# Patient Record
Sex: FEMALE | Race: OTHER | Employment: OTHER | URBAN - METROPOLITAN AREA
[De-identification: names, ages, dates, MRNs, and addresses within clinical notes are randomized per-mention and may not be internally consistent; named-entity substitution may affect disease eponyms.]

---

## 2017-07-27 PROCEDURE — 88305 TISSUE EXAM BY PATHOLOGIST: CPT | Performed by: INTERNAL MEDICINE

## 2017-07-27 PROCEDURE — 88342 IMHCHEM/IMCYTCHM 1ST ANTB: CPT | Performed by: INTERNAL MEDICINE

## 2017-08-03 PROCEDURE — 82784 ASSAY IGA/IGD/IGG/IGM EACH: CPT | Performed by: INTERNAL MEDICINE

## 2017-08-03 PROCEDURE — 86255 FLUORESCENT ANTIBODY SCREEN: CPT | Performed by: INTERNAL MEDICINE

## 2017-08-03 PROCEDURE — 83516 IMMUNOASSAY NONANTIBODY: CPT | Performed by: INTERNAL MEDICINE

## 2017-11-02 PROCEDURE — 81003 URINALYSIS AUTO W/O SCOPE: CPT | Performed by: FAMILY MEDICINE

## 2018-07-03 PROCEDURE — 88307 TISSUE EXAM BY PATHOLOGIST: CPT | Performed by: SURGERY

## 2018-10-24 PROBLEM — Z00.00 WELL ADULT EXAM: Status: ACTIVE | Noted: 2018-10-24

## 2019-01-22 PROCEDURE — 81003 URINALYSIS AUTO W/O SCOPE: CPT | Performed by: FAMILY MEDICINE

## 2019-11-18 PROBLEM — Z00.00 WELL ADULT EXAM: Status: RESOLVED | Noted: 2018-10-24 | Resolved: 2019-11-18

## 2020-08-13 PROBLEM — E89.0 S/P PARTIAL THYROIDECTOMY: Status: ACTIVE | Noted: 2020-08-13

## 2021-02-18 PROBLEM — E03.2 IATROGENIC HYPOTHYROIDISM: Status: ACTIVE | Noted: 2021-02-18

## 2021-02-18 PROBLEM — Z86.39 HISTORY OF HYPERTHYROIDISM: Status: ACTIVE | Noted: 2021-02-18

## 2021-03-04 NOTE — LETTER
[address]  Authorization for Invasive Procedure                                                                                           1. I hereby authorize Marco A Lucio DO, my physician and his/her assistants (if applicable), which may include medical students, residents, and/or fellows, to perform the following surgical operation/ procedure and administer such anesthesia as may be determined necessary by my physician: Operation/Procedure name (s) BRONCHOSCOPY with bronchveolar lavage and possible brushings on Monica Bruryanichesi   2. I recognize that during the surgical operation/procedure, unforeseen conditions may necessitate additional or different procedures than those listed above. I, therefore, further authorize and request that the above-named surgeon, assistants, or designees perform such procedures as are, in their judgment, necessary and desirable. 3.   My surgeon/physician has discussed prior to my surgery the potential benefits, risks and side effects of this procedure; the likelihood of achieving goals; and potential problems that might occur during recuperation. They also discussed reasonable alternatives to the procedure, including risks, benefits, and side effects related to the alternatives and risks related to not receiving this procedure. I have had all my questions answered and I acknowledge that no guarantee has been made as to the result that may be obtained. 4.   Should the need arise during my operation/procedure, which includes change of level of care prior to discharge, I also consent to the administration of blood and/or blood products. Further, I understand that despite careful testing and screening of blood or blood products by collecting agencies, I may still be subject to ill effects as a result of receiving a blood transfusion and/or blood products.   The following are some, but not all, of the potential risks that can occur: fever and allergic reactions, hemolytic reactions, transmission of diseases such as Hepatitis, AIDS and Cytomegalovirus (CMV) and fluid overload. In the event that I wish to have an autologous transfusion of my own blood, or a directed donor transfusion, I will discuss this with my physician. Check only if Refusing Blood or Blood Products  I understand refusal of blood or blood products as deemed necessary by my physician may have serious consequences to my condition to include possible death. I hereby assume responsibility for my refusal and release the hospital, its personnel, and my physicians from any responsibility for the consequences of my refusal.    o  Refuse   5. I authorize the use of any specimen, organs, tissues, body parts or foreign objects that may be removed from my body during the operation/procedure for diagnosis, research or teaching purposes and their subsequent disposal by hospital authorities. I also authorize the release of specimen test results and/or written reports to my treating physician on the hospital medical staff or other referring or consulting physicians involved in my care, at the discretion of the Pathologist or my treating physician. 6.   I consent to the photographing or videotaping of the operations or procedures to be performed, including appropriate portions of my body for medical, scientific, or educational purposes, provided my identity is not revealed by the pictures or by descriptive texts accompanying them. If the procedure has been photographed/videotaped, the surgeon will obtain the original picture, image, videotape or CD. The hospital will not be responsible for storage, release or maintenance of the picture, image, tape or CD.    7.   I consent to the presence of a  or observers in the operating room as deemed necessary by my physician or their designees.     8.   I recognize that in the event my procedure results in extended X-Ray/fluoroscopy time, I may develop a skin reaction. 9. If I have a Do Not Attempt Resuscitation (DNAR) order in place, that status will be suspended while in the operating room, procedural suite, and during the recovery period unless otherwise explicitly stated by me (or a person authorized to consent on my behalf). The surgeon or my attending physician will determine when the applicable recovery period ends for purposes of reinstating the DNAR order. 10. Patients having a sterilization procedure: I understand that if the procedure is successful the results will be permanent and it will therefore be impossible for me to inseminate, conceive, or bear children. I also understand that the procedure is intended to result in sterility, although the result has not been guaranteed. 11. I acknowledge that my physician has explained sedation/analgesia administration to me including the risk and benefits I consent to the administration of sedation/analgesia as may be necessary or desirable in the judgment of my physician. I CERTIFY THAT I HAVE READ AND FULLY UNDERSTAND THE ABOVE CONSENT TO OPERATION and/or OTHER PROCEDURE.     _________________________________________ _________________________________     ___________________________________  Signature of Patient     Signature of Responsible Person                   Printed Name of Responsible Person                              _________________________________________ ______________________________        ___________________________________  Signature of Witness         Date  Time         Relationship to Patient    STATEMENT OF PHYSICIAN My signature below affirms that prior to the time of the procedure; I have explained to the patient and/or his/her legal representative, the risks and benefits involved in the proposed treatment and any reasonable alternative to the proposed treatment.  I have also explained the risks and benefits involved in refusal of the proposed treatment and alternatives to the proposed treatment and have answered the patient's questions.  If I have a significant financial interest in a co-management agreement or a significant financial interest in any product or implant, or other significant relationship used in this procedure/surgery, I have disclosed this and had a discussion with my patient.     _______________________________________________________________ _____________________________  Brendia Fat of Physician)                                                                                         (Date)                                   (Time)  Patient Name: Kraig Child    : 1945   Printed: 2023      Medical Record #: [id number]                                              Page 1 of 1 Patent

## 2022-03-31 PROBLEM — M75.101 ROTATOR CUFF SYNDROME OF RIGHT SHOULDER: Status: ACTIVE | Noted: 2022-03-31

## 2023-01-08 ENCOUNTER — LAB ENCOUNTER (OUTPATIENT)
Dept: LAB | Facility: HOSPITAL | Age: 78
End: 2023-01-08
Attending: STUDENT IN AN ORGANIZED HEALTH CARE EDUCATION/TRAINING PROGRAM
Payer: MEDICARE

## 2023-01-08 DIAGNOSIS — Z01.818 PRE-OP TESTING: ICD-10-CM

## 2023-01-09 LAB — SARS-COV-2 RNA RESP QL NAA+PROBE: NOT DETECTED

## 2023-01-11 ENCOUNTER — ANESTHESIA EVENT (OUTPATIENT)
Dept: ENDOSCOPY | Facility: HOSPITAL | Age: 78
End: 2023-01-11
Payer: MEDICARE

## 2023-01-11 ENCOUNTER — HOSPITAL ENCOUNTER (OUTPATIENT)
Facility: HOSPITAL | Age: 78
Setting detail: HOSPITAL OUTPATIENT SURGERY
Discharge: HOME | End: 2023-01-11
Attending: STUDENT IN AN ORGANIZED HEALTH CARE EDUCATION/TRAINING PROGRAM | Admitting: STUDENT IN AN ORGANIZED HEALTH CARE EDUCATION/TRAINING PROGRAM
Payer: MEDICARE

## 2023-01-11 ENCOUNTER — ANESTHESIA (OUTPATIENT)
Dept: ENDOSCOPY | Facility: HOSPITAL | Age: 78
End: 2023-01-11
Payer: MEDICARE

## 2023-01-11 DIAGNOSIS — Z01.818 PRE-OP TESTING: Primary | ICD-10-CM

## 2023-01-11 DIAGNOSIS — R93.89 ABNORMAL CT OF THE CHEST: ICD-10-CM

## 2023-01-11 LAB
BASOPHILS NFR BRONCH: 0 %
COLOR FLD: COLORLESS
EOSINOPHIL NFR BRONCH: 3 %
LYMPHOCYTES NFR BRONCH: 8 %
M TB CMPLX RRNA SPEC QL PROBE: NOT DETECTED
MONOS+MACROS NFR BRONCH: 9 %
NEUTROPHILS NFR BRONCH: 79 %
RBC # FLD: 494 /CUMM (ref ?–1)
TOTAL CELLS COUNTED BRONCH: 536 /CUMM (ref ?–1)
TOTAL CELLS COUNTED FLD: 100
WBC CALC (IRIS) BRW: 531 /CUMM
WBC OTHER NFR BRONCH: 1 %

## 2023-01-11 PROCEDURE — 87205 SMEAR GRAM STAIN: CPT | Performed by: STUDENT IN AN ORGANIZED HEALTH CARE EDUCATION/TRAINING PROGRAM

## 2023-01-11 PROCEDURE — 88112 CYTOPATH CELL ENHANCE TECH: CPT | Performed by: STUDENT IN AN ORGANIZED HEALTH CARE EDUCATION/TRAINING PROGRAM

## 2023-01-11 PROCEDURE — 87305 ASPERGILLUS AG IA: CPT | Performed by: STUDENT IN AN ORGANIZED HEALTH CARE EDUCATION/TRAINING PROGRAM

## 2023-01-11 PROCEDURE — 87556 M.TUBERCULO DNA AMP PROBE: CPT | Performed by: STUDENT IN AN ORGANIZED HEALTH CARE EDUCATION/TRAINING PROGRAM

## 2023-01-11 PROCEDURE — 88160 CYTOPATH SMEAR OTHER SOURCE: CPT | Performed by: STUDENT IN AN ORGANIZED HEALTH CARE EDUCATION/TRAINING PROGRAM

## 2023-01-11 PROCEDURE — 87071 CULTURE AEROBIC QUANT OTHER: CPT | Performed by: STUDENT IN AN ORGANIZED HEALTH CARE EDUCATION/TRAINING PROGRAM

## 2023-01-11 PROCEDURE — 89050 BODY FLUID CELL COUNT: CPT | Performed by: STUDENT IN AN ORGANIZED HEALTH CARE EDUCATION/TRAINING PROGRAM

## 2023-01-11 PROCEDURE — 87015 SPECIMEN INFECT AGNT CONCNTJ: CPT

## 2023-01-11 PROCEDURE — 87206 SMEAR FLUORESCENT/ACID STAI: CPT | Performed by: STUDENT IN AN ORGANIZED HEALTH CARE EDUCATION/TRAINING PROGRAM

## 2023-01-11 PROCEDURE — 0B9F8ZX DRAINAGE OF RIGHT LOWER LUNG LOBE, VIA NATURAL OR ARTIFICIAL OPENING ENDOSCOPIC, DIAGNOSTIC: ICD-10-PCS | Performed by: STUDENT IN AN ORGANIZED HEALTH CARE EDUCATION/TRAINING PROGRAM

## 2023-01-11 PROCEDURE — 89051 BODY FLUID CELL COUNT: CPT | Performed by: STUDENT IN AN ORGANIZED HEALTH CARE EDUCATION/TRAINING PROGRAM

## 2023-01-11 PROCEDURE — 87798 DETECT AGENT NOS DNA AMP: CPT | Performed by: STUDENT IN AN ORGANIZED HEALTH CARE EDUCATION/TRAINING PROGRAM

## 2023-01-11 PROCEDURE — 87102 FUNGUS ISOLATION CULTURE: CPT | Performed by: STUDENT IN AN ORGANIZED HEALTH CARE EDUCATION/TRAINING PROGRAM

## 2023-01-11 PROCEDURE — 87116 MYCOBACTERIA CULTURE: CPT | Performed by: STUDENT IN AN ORGANIZED HEALTH CARE EDUCATION/TRAINING PROGRAM

## 2023-01-11 RX ORDER — GLYCOPYRROLATE 0.2 MG/ML
INJECTION, SOLUTION INTRAMUSCULAR; INTRAVENOUS AS NEEDED
Status: DISCONTINUED | OUTPATIENT
Start: 2023-01-11 | End: 2023-01-11 | Stop reason: SURG

## 2023-01-11 RX ORDER — NALOXONE HYDROCHLORIDE 0.4 MG/ML
80 INJECTION, SOLUTION INTRAMUSCULAR; INTRAVENOUS; SUBCUTANEOUS AS NEEDED
OUTPATIENT
Start: 2023-01-11 | End: 2023-01-11

## 2023-01-11 RX ORDER — ONDANSETRON 2 MG/ML
INJECTION INTRAMUSCULAR; INTRAVENOUS AS NEEDED
Status: DISCONTINUED | OUTPATIENT
Start: 2023-01-11 | End: 2023-01-11 | Stop reason: SURG

## 2023-01-11 RX ORDER — SODIUM CHLORIDE, SODIUM LACTATE, POTASSIUM CHLORIDE, CALCIUM CHLORIDE 600; 310; 30; 20 MG/100ML; MG/100ML; MG/100ML; MG/100ML
INJECTION, SOLUTION INTRAVENOUS CONTINUOUS
OUTPATIENT
Start: 2023-01-11

## 2023-01-11 RX ORDER — SODIUM CHLORIDE, SODIUM LACTATE, POTASSIUM CHLORIDE, CALCIUM CHLORIDE 600; 310; 30; 20 MG/100ML; MG/100ML; MG/100ML; MG/100ML
INJECTION, SOLUTION INTRAVENOUS CONTINUOUS
Status: DISCONTINUED | OUTPATIENT
Start: 2023-01-11 | End: 2023-01-11

## 2023-01-11 RX ORDER — SODIUM CHLORIDE 9 MG/ML
INJECTION, SOLUTION INTRAVENOUS CONTINUOUS PRN
Status: DISCONTINUED | OUTPATIENT
Start: 2023-01-11 | End: 2023-01-11 | Stop reason: SURG

## 2023-01-11 RX ADMIN — SODIUM CHLORIDE: 9 INJECTION, SOLUTION INTRAVENOUS at 09:16:00

## 2023-01-11 RX ADMIN — ONDANSETRON 4 MG: 2 INJECTION INTRAMUSCULAR; INTRAVENOUS at 09:17:00

## 2023-01-11 RX ADMIN — GLYCOPYRROLATE 0.2 MG: 0.2 INJECTION, SOLUTION INTRAMUSCULAR; INTRAVENOUS at 09:17:00

## 2023-01-11 NOTE — PROCEDURES
Bronchoscopy Procedure Note     Preop diagnosis: Abnormal CT Chest    Postop diagnosis: Same    Procedure Performed:  1. Bronchoscopy with airway inspection  2. Bronchoscopy with bronchoalveolar lavage; right lower lobe    Pre-procedure: Informed consent was obtained and documented. A time out was performed. Vital signs, laboratory studies, chest imaging, medications, and allergies were reviewed. Continuous cardiopulmonary monitoring and supplemental oxygen were provided. The posterior oral pharynx was anesthetized with lidocaine gargle and nebulizer. Procedure: A bronchoscope was passed through the mouth. The oropharynx and larynx were visualized and transversed without difficulty and the bronchoscope was passed through the vocal cords. 2 cc aliquots of 1% lidocaine were applied as needed to the larynx and tracheobronchial tree. Patient was sedated using MAC anesthesia; for further details, please see anesthesia records    Airway: Inspection showed a normal trachea, sharp yumiko, and otherwise normal anatomy of the left and right tracheobronchial trees to the subsegmental level. BAL location: right lower lobe, RB5 segment  Amount instilled: 100 cc  Amount returned: 30 cc    The sample was sent for cell count with diff, gram stain and culture, AFB smear and culture, fungal smear and culture and cytology. Post-procedure: The patient tolerated the procedural well without immediate complications. Marvin Hendrickson.  [facility]    [unfilled]  9:34 AM

## 2023-01-11 NOTE — DISCHARGE INSTRUCTIONS
OUTPATIENT INSTRUCTIONS FOLLOWING BRONCHOSCOPY    1. Due to the after effect of the medication given during yoru bronchoscopy, we would advise that        for 12 hours after the procedure you:  Not return to work  Not drive a car or other vehicle  Not operate machinery (including kitchen equipment)  Not drink alcohol    2. Prior to your examination, a local anesthetic was used to numb the back of your throat. Do        not eat for one to two hours. Sip fluids initially and advance to your regular diet as tolerated. 3.  Tenderness or swelling occasionally occur at the site where the medication was injected. Should this occur, it is helpful to apply heat to the area and notify your doctor. 4.  Contact your doctor is you experience any of the following:  Difficulty in breathing  Fever greater than 101  Large amounts of blood in sputum (small amount of blood in sputum expected after the biopsy)    5. If a biopsy or cytology was performed, you will be notified should further investigation be recommended. Your referring physician will receive a full report of your examination and will contact you.     6. DOCTOR'S COMMENTS:

## 2023-01-12 VITALS
RESPIRATION RATE: 19 BRPM | OXYGEN SATURATION: 97 % | BODY MASS INDEX: 21.6 KG/M2 | HEART RATE: 90 BPM | SYSTOLIC BLOOD PRESSURE: 121 MMHG | HEIGHT: 60 IN | DIASTOLIC BLOOD PRESSURE: 81 MMHG | TEMPERATURE: 97 F | WEIGHT: 110 LBS

## 2023-01-13 LAB
ASPERGILLUS GALACTOMANNAN AG, BAL: NEGATIVE
ASPERGILLUS GALACTOMANNAN INDX: 0.09
CALCOFLUOR WHITE STAIN RESULT: NEGATIVE

## 2023-04-11 ENCOUNTER — OFFICE VISIT (OUTPATIENT)
Dept: OTOLARYNGOLOGY | Facility: CLINIC | Age: 78
End: 2023-04-11

## 2023-04-11 VITALS — BODY MASS INDEX: 21.6 KG/M2 | HEIGHT: 60 IN | WEIGHT: 110 LBS

## 2023-04-11 DIAGNOSIS — R09.82 PND (POST-NASAL DRIP): Primary | ICD-10-CM

## 2023-04-11 PROCEDURE — 99203 OFFICE O/P NEW LOW 30 MIN: CPT | Performed by: STUDENT IN AN ORGANIZED HEALTH CARE EDUCATION/TRAINING PROGRAM

## 2023-04-11 RX ORDER — GLYCOPYRROLATE 1 MG/1
1 TABLET ORAL 2 TIMES DAILY
Qty: 90 TABLET | Refills: 1 | Status: SHIPPED | OUTPATIENT
Start: 2023-04-11 | End: 2023-04-11

## 2023-04-11 RX ORDER — GLYCOPYRROLATE 1 MG/1
1 TABLET ORAL 2 TIMES DAILY
Qty: 90 TABLET | Refills: 1 | Status: SHIPPED | OUTPATIENT
Start: 2023-04-11 | End: 2023-05-11

## 2023-04-11 RX ORDER — MONTELUKAST SODIUM 10 MG/1
10 TABLET ORAL NIGHTLY
Qty: 30 TABLET | Refills: 3 | Status: SHIPPED | OUTPATIENT
Start: 2023-04-11 | End: 2023-04-11

## 2023-04-25 ENCOUNTER — PATIENT MESSAGE (OUTPATIENT)
Dept: OTOLARYNGOLOGY | Facility: CLINIC | Age: 78
End: 2023-04-25

## 2023-04-28 ENCOUNTER — OFFICE VISIT (OUTPATIENT)
Dept: OTOLARYNGOLOGY | Facility: CLINIC | Age: 78
End: 2023-04-28

## 2023-04-28 VITALS — WEIGHT: 110 LBS | BODY MASS INDEX: 21.6 KG/M2 | HEIGHT: 60 IN

## 2023-04-28 DIAGNOSIS — R09.82 PND (POST-NASAL DRIP): ICD-10-CM

## 2023-04-28 DIAGNOSIS — H69.90 EUSTACHIAN TUBE DISORDER, UNSPECIFIED LATERALITY: Primary | ICD-10-CM

## 2023-04-28 RX ORDER — METHYLPREDNISOLONE 4 MG/1
TABLET ORAL
Qty: 21 TABLET | Refills: 0 | Status: SHIPPED | OUTPATIENT
Start: 2023-04-28

## 2023-04-28 RX ORDER — PSEUDOEPHEDRINE HCL 120 MG/1
120 TABLET, FILM COATED, EXTENDED RELEASE ORAL EVERY 12 HOURS
Qty: 42 TABLET | Refills: 0 | Status: SHIPPED | OUTPATIENT
Start: 2023-04-28 | End: 2023-05-19

## 2023-05-12 ENCOUNTER — OFFICE VISIT (OUTPATIENT)
Dept: OTOLARYNGOLOGY | Facility: CLINIC | Age: 78
End: 2023-05-12

## 2023-05-12 DIAGNOSIS — H69.90 EUSTACHIAN TUBE DISORDER, UNSPECIFIED LATERALITY: Primary | ICD-10-CM

## 2023-05-12 DIAGNOSIS — R09.82 PND (POST-NASAL DRIP): ICD-10-CM

## 2023-05-31 NOTE — TELEPHONE ENCOUNTER
From: Halima Portillo  To: Phong Epps MD  Sent: 4/25/2023 3:34 PM CDT  Subject: Dick Senior  I made an appointment to see you on Friday the 28th  My Ear is plugged and I have tried everything. I'm   taking Allegra and the medication you gave me  plus all the nasal sprays that were given to me. Nothing is helping. Maybe you can try something else.   Thank you  Halima Portillo

## 2023-06-16 RX ORDER — PSEUDOEPHEDRINE HCL 120 MG/1
TABLET, FILM COATED, EXTENDED RELEASE ORAL
Qty: 42 TABLET | Refills: 0 | Status: SHIPPED | OUTPATIENT
Start: 2023-06-16

## 2023-08-02 NOTE — TELEPHONE ENCOUNTER
This refill request is being sent to the provider for the following reason:  []Patient has not had an appointment within the past 12 months but has made an appointment on: ___  [x]Medication is not within protocol  []Patient did not complete follow up recommendations  []Other: ___  Dr. Melissa Zambrano, please review and send. Thank you.

## 2023-08-14 RX ORDER — PSEUDOEPHEDRINE HCL 120 MG/1
120 TABLET, FILM COATED, EXTENDED RELEASE ORAL EVERY 12 HOURS
Qty: 42 TABLET | Refills: 0 | Status: SHIPPED | OUTPATIENT
Start: 2023-08-14

## 2023-09-18 ENCOUNTER — OFFICE VISIT (OUTPATIENT)
Dept: OTOLARYNGOLOGY | Facility: CLINIC | Age: 78
End: 2023-09-18

## 2023-09-18 VITALS — BODY MASS INDEX: 21 KG/M2 | HEIGHT: 60 IN

## 2023-09-18 DIAGNOSIS — H61.23 BILATERAL IMPACTED CERUMEN: Primary | ICD-10-CM

## 2023-09-18 DIAGNOSIS — R09.81 NASAL CONGESTION: ICD-10-CM

## 2023-09-18 DIAGNOSIS — R09.89 PHLEGM IN THROAT: ICD-10-CM

## 2023-09-18 DIAGNOSIS — R09.82 PND (POST-NASAL DRIP): ICD-10-CM

## 2023-09-18 DIAGNOSIS — J30.9 CHRONIC ALLERGIC RHINITIS: ICD-10-CM

## 2023-09-18 RX ORDER — IPRATROPIUM BROMIDE 21 UG/1
2 SPRAY, METERED NASAL 3 TIMES DAILY PRN
COMMUNITY
Start: 2023-08-08

## 2023-09-18 RX ORDER — LEVOTHYROXINE SODIUM 0.1 MG/1
100 TABLET ORAL DAILY
COMMUNITY
Start: 2023-07-13

## 2023-09-18 RX ORDER — MONTELUKAST SODIUM 10 MG/1
10 TABLET ORAL NIGHTLY
COMMUNITY
Start: 2023-05-30

## 2023-09-18 RX ORDER — FLUTICASONE PROPIONATE 50 MCG
2 SPRAY, SUSPENSION (ML) NASAL DAILY
COMMUNITY
Start: 2023-08-30

## 2023-09-18 RX ORDER — GLYCOPYRROLATE 1 MG/1
1 TABLET ORAL 3 TIMES DAILY
Qty: 90 TABLET | Refills: 0 | Status: SHIPPED | OUTPATIENT
Start: 2023-09-18 | End: 2023-10-18

## 2023-09-18 RX ORDER — GLYCOPYRROLATE 1 MG/1
1 TABLET ORAL 2 TIMES DAILY
COMMUNITY
Start: 2023-04-11

## 2023-09-18 RX ORDER — AZELASTINE 1 MG/ML
2 SPRAY, METERED NASAL 2 TIMES DAILY
Qty: 30 ML | Refills: 0 | Status: SHIPPED | OUTPATIENT
Start: 2023-09-18

## 2023-10-31 ENCOUNTER — TELEPHONE (OUTPATIENT)
Dept: OTOLARYNGOLOGY | Facility: CLINIC | Age: 78
End: 2023-10-31

## 2023-10-31 NOTE — TELEPHONE ENCOUNTER
Scheduled appointment. Per LOV note Dr. Josafat Kay wanted to see her back in 3-4 weeks. She wished to schedule in late November.      Future Appointments   Date Time Provider Jeferson Arceo   11/21/2023  1:00 PM Naun Durant MD [address] [facility]

## 2023-10-31 NOTE — TELEPHONE ENCOUNTER
Pt sent on a message on [employer] states medication not working should she continue or should she be on something else please advise       still having Post Nasal drip that's still causing  Thick Clear Mucus that Im coughing up. .  You gave me a Nasal Decongestant and   Glycopyrrolate. I've been taking for a while. Do I need to see you again or can you change my  prescriptions for something else? Please advise.       Thank you  Daniel Bansal

## 2023-11-21 ENCOUNTER — OFFICE VISIT (OUTPATIENT)
Dept: OTOLARYNGOLOGY | Facility: CLINIC | Age: 78
End: 2023-11-21

## 2023-11-21 VITALS — BODY MASS INDEX: 21 KG/M2 | HEIGHT: 60 IN

## 2023-11-21 DIAGNOSIS — R09.82 PND (POST-NASAL DRIP): ICD-10-CM

## 2023-11-21 DIAGNOSIS — R09.89 PHLEGM IN THROAT: Primary | ICD-10-CM

## 2023-11-21 PROCEDURE — 99213 OFFICE O/P EST LOW 20 MIN: CPT | Performed by: STUDENT IN AN ORGANIZED HEALTH CARE EDUCATION/TRAINING PROGRAM

## 2024-02-05 ENCOUNTER — OFFICE VISIT (OUTPATIENT)
Dept: OTOLARYNGOLOGY | Facility: CLINIC | Age: 79
End: 2024-02-05
Payer: MEDICARE

## 2024-02-05 VITALS — BODY MASS INDEX: 20.62 KG/M2 | HEIGHT: 60 IN | WEIGHT: 105 LBS

## 2024-02-05 DIAGNOSIS — R09.81 NASAL CONGESTION: Primary | ICD-10-CM

## 2024-02-05 DIAGNOSIS — H61.22 EXCESSIVE CERUMEN IN EAR CANAL, LEFT: ICD-10-CM

## 2024-02-05 RX ORDER — IPRATROPIUM BROMIDE 42 UG/1
1 SPRAY, METERED NASAL 2 TIMES DAILY
Qty: 1 EACH | Refills: 5 | Status: SHIPPED | OUTPATIENT
Start: 2024-02-05

## 2024-02-05 NOTE — PROGRESS NOTES
Monica Avery is a 78 year old female.   Chief Complaint   Patient presents with    Follow - Up     Patient is here for phlegm throat f/u, pt reports improvement.    Ear Problem     Patient is here for bilateral clogged ears, pt states she is able to hear clearly,no pain, no drainage but feels like fluid is in ears.       ASSESSMENT AND PLAN:   1. Nasal congestion  78-year-old presents in follow-up regarding her nasal congestion and ear fullness.  She states that she feels like both of her ears may be clogged.  She denies any pain or hearing issues.  She notes that since last visit she stopped the Sudafed and this actually helped a lot with her phlegm production and that she feels very well with regards to her nose.  She has been using the Atrovent nasal spray on a nightly basis that she says that this helps to keep her nose from running and would like refills    On exam she had cerumen on the left none on the right otherwise normal otologic exam.  She had a healthy appearing posterior pharynx and nasal cavity mucosa    She has been doing well with Atrovent nasal spray with regards to her nasal drainage.  Will send her in refills.  She can use this once or twice a day she has not been having any side effects.  She says that she has been feeling very well since stopping the Sudafed and may have been thickening her mucus and secretions.  She should continue to avoid using it.  Her ear fullness may be related to ear canal dryness or dermatitis that was slightly erythematous and thickened.  I did not see any other abnormal ear exam findings.  Discussed she could consider a steroid ointment to the outside of her ear canals she would like to avoid eardrops.    2. Excessive cerumen in ear canal, left        The patient indicates understanding of these issues and agrees to the plan.      EXAM:   Ht 5' (1.524 m)   Wt 105 lb (47.6 kg)   BMI 20.51 kg/m²     Pertinent exam findings may also be noted above in assessment  and plan     System Details   Skin Inspection - Normal.   Constitutional Overall appearance - Normal.   Head/Face Symmetric, TMJ tenderness not present    Eyes EOMI, PERRL   Right ear:  Canal clear, TM intact, no TAMMI   Left ear:  Canal clear, TM intact, no TAMMI   Nose: Septum midline, inferior turbinates not enlarged, nasal valves without collapse    Oral cavity/Oropharynx: No lesions or masses on inspection or palpation, tonsils symmetric    Neck: Soft without LAD, thyroid not enlarged  Voice clear/ no stridor   Other:      Scopes and Procedures:     Canals:  Left: Canal with cerumen preventing adequate view of TM, debrided with instrumentation    Tympanic Membranes:  Left: Normal tympanic membrane.     TM Visualized Method:   Left TM examined via otomicroscopy.      PROCEDURE:   Removal of cerumen impaction   The cerumen impaction was completely removed on the left side using microscopy as necessary.   Removal was completed by using a curette and suction.         Current Outpatient Medications   Medication Sig Dispense Refill    ipratropium 0.06 % Nasal Solution 1 spray by Nasal route 2 (two) times daily. 1 each 5    loratadine-pseudoephedrine ER  MG Oral Tablet 24 Hr Take 1 tablet by mouth at bedtime. 30 tablet 1    fluticasone propionate 50 MCG/ACT Nasal Suspension 2 sprays by Nasal route daily.      glycopyrrolate 1 MG Oral Tab Take 1 tablet (1 mg total) by mouth 2 (two) times daily.      ipratropium 0.03 % Nasal Solution 2 sprays 3 (three) times daily as needed for Rhinitis.      montelukast 10 MG Oral Tab Take 1 tablet (10 mg total) by mouth nightly.      levothyroxine 100 MCG Oral Tab Take 1 tablet (100 mcg total) by mouth daily.      azelastine 0.1 % Nasal Solution 2 sprays by Nasal route 2 (two) times daily. 30 mL 0    methylPREDNISolone 4 MG Oral Tablet Therapy Pack Take by oral route. 21 tablet 0    TRAMADOL 50 MG Oral Tab TAKE 1 TABLET BY MOUTH AT NIGHT AS NEEDED FOR PAIN 30 tablet 0     methylPREDNISolone 4 MG Oral Tablet Therapy Pack Use as directed for 6 days 21 tablet 0    levothyroxine 75 MCG Oral Tab Take 1 tablet (75 mcg total) by mouth daily. 90 tablet 3    atorvastatin 20 MG Oral Tab Take 1 tablet (20 mg total) by mouth daily. 90 tablet 3    fluticasone-salmeterol 250-50 MCG/DOSE Inhalation Aerosol Powder, Breath Activated Inhale 1 puff into the lungs every 12 (twelve) hours. 60 each 2    loratadine 10 MG Oral Tab Take 1 tablet (10 mg total) by mouth daily. 30 tablet 1    ketorolac tromethamine 0.5 % Ophthalmic Solution Apply 1 drop to eye 4 (four) times daily. To the operative eye starting 3 days prior to surgery 5 mL 1    Multiple Vitamins-Minerals (CENTRUM SILVER) Oral Tab Take 1 tablet by mouth daily.      aspirin 81 MG Oral Tab EC Take 1 tablet (81 mg total) by mouth daily.      pseudoephedrine  MG Oral Tablet 12 Hr Take 1 tablet (120 mg total) by mouth Q12H. (Patient not taking: Reported on 2/5/2024) 42 tablet 0      Past Medical History:   Diagnosis Date    Asthma     Coronary atherosclerosis     Disorder of thyroid     Hypercholesterolemia 10/03/2013    Other and unspecified hyperlipidemia       Social History:  Social History     Socioeconomic History    Marital status:    Tobacco Use    Smoking status: Former    Smokeless tobacco: Never    Tobacco comments:     Quit when she was 34 yo   Substance and Sexual Activity    Alcohol use: Yes     Comment: rarely    Drug use: No          Arcadio Javier MD  2/5/2024  1:49 PM

## 2024-05-14 ENCOUNTER — OFFICE VISIT (OUTPATIENT)
Dept: OTOLARYNGOLOGY | Facility: CLINIC | Age: 79
End: 2024-05-14

## 2024-05-14 DIAGNOSIS — H69.90 EUSTACHIAN TUBE DISORDER, UNSPECIFIED LATERALITY: Primary | ICD-10-CM

## 2024-05-14 DIAGNOSIS — R09.81 NASAL CONGESTION: ICD-10-CM

## 2024-05-14 DIAGNOSIS — J30.9 CHRONIC ALLERGIC RHINITIS: ICD-10-CM

## 2024-05-14 DIAGNOSIS — R09.82 PND (POST-NASAL DRIP): ICD-10-CM

## 2024-05-14 PROCEDURE — 99214 OFFICE O/P EST MOD 30 MIN: CPT | Performed by: STUDENT IN AN ORGANIZED HEALTH CARE EDUCATION/TRAINING PROGRAM

## 2024-05-14 PROCEDURE — 92504 EAR MICROSCOPY EXAMINATION: CPT | Performed by: STUDENT IN AN ORGANIZED HEALTH CARE EDUCATION/TRAINING PROGRAM

## 2024-05-14 PROCEDURE — 92567 TYMPANOMETRY: CPT | Performed by: STUDENT IN AN ORGANIZED HEALTH CARE EDUCATION/TRAINING PROGRAM

## 2024-05-14 PROCEDURE — 31231 NASAL ENDOSCOPY DX: CPT | Performed by: STUDENT IN AN ORGANIZED HEALTH CARE EDUCATION/TRAINING PROGRAM

## 2024-05-14 RX ORDER — METHYLPREDNISOLONE 4 MG/1
TABLET ORAL
Qty: 21 TABLET | Refills: 0 | Status: SHIPPED | OUTPATIENT
Start: 2024-05-14

## 2024-05-14 RX ORDER — IPRATROPIUM BROMIDE 42 UG/1
2 SPRAY, METERED NASAL 2 TIMES DAILY
Qty: 1 EACH | Refills: 5 | Status: SHIPPED | OUTPATIENT
Start: 2024-05-14

## 2024-05-14 RX ORDER — AZELASTINE 1 MG/ML
2 SPRAY, METERED NASAL 2 TIMES DAILY
Qty: 30 ML | Refills: 0 | Status: SHIPPED | OUTPATIENT
Start: 2024-05-14

## 2024-05-14 NOTE — PROGRESS NOTES
Monica Avery is a 78 year old female.   Chief Complaint   Patient presents with    Follow - Up     Sinus Congestion, left ear possible fluid        ASSESSMENT AND PLAN:   1. Eustachian tube disorder, unspecified laterality  Is a 78-year-old who presents with a complaints of of left ear fullness as well as sinus congestion.  This has been going on for couple months now.  There is some confusion with what medication she has been taking at home she does not quite remember    On exam she has slight retraction of her left tympanic membrane she had a negative middle ear pressure on this left side of -167.  I did not see any purulence on nasal endoscopy    She has a eustachian tube dysfunction on the left there is no fluid to warrant a myringotomy today.  She has been using Sudafed.  She is starting to feel like she can slightly pop her ear.  She is quite bothered by this discussed adding a Medrol Dosepak to see if it might help relieve the swelling of her eustachian tube.  Her with regards to her sinuses I do not see a sinusitis.  She is somewhat confused on what medication she has been taking at home.  Have seen her several times in the past for this have tried Robinul, Sudafed, Atrovent and Astelin.  She sometimes reports improvement and sometimes reports that they are medications are not helping we have made several adjustments.  Agreed that we will simply try Atrovent and Astelin nasal spray together.  This may represent a vasomotor rhinitis and I discussed there are procedures available for this but she would like to currently observe which is reasonable given her age.  Greater than 30 minutes was spent reviewing previous documentation, history and exam not including the nasal endoscopy or other procedures and discussion regarding the treatment plan and coordination of care    2. Chronic allergic rhinitis      3. Nasal congestion      4. PND (post-nasal drip)        The patient indicates understanding of  these issues and agrees to the plan.      EXAM:   There were no vitals taken for this visit.    Pertinent exam findings may also be noted above in assessment and plan     System Details   Skin Inspection - Normal.   Constitutional Overall appearance - Normal.   Head/Face Symmetric, TMJ tenderness not present    Eyes EOMI, PERRL   Right ear:  Canal clear, TM intact, no TAMMI   Left ear:  Canal clear, TM intact, no TAMMI   Nose: Septum midline, inferior turbinates not enlarged, nasal valves without collapse    Oral cavity/Oropharynx: No lesions or masses on inspection or palpation, tonsils symmetric    Neck: Soft without LAD, thyroid not enlarged  Voice clear/ no stridor   Other:      Scopes and Procedures:     Nasal Endoscopy Procedure Note     Due to inability for adequate examination of the nose and nasopharynx and need for magnification to perform the examination, endoscopy was performed.  Risks and benefits were discussed with patient/family and they have given verbal consent to proceed.    Pre-operative Diagnosis:   1. Eustachian tube disorder, unspecified laterality    2. Chronic allergic rhinitis    3. Nasal congestion    4. PND (post-nasal drip)        Post-operative Diagnosis: Same    Procedure: Diagnostic nasal endoscopy    Anesthesia: Topical anesthetic [place]     Surgeon Arcadio Javier MD    EBL: 0cc    Procedure Detail & Findings:     After placement of topical anesthetic intranasally the endoscope was inserted into each nares and driven through the nasal cavity into the nasopharynx. The following findings were noted:    Septum: Midline  Inferior turbinates: Normal  Middle meatus: Patent  Middle turbinates: Normal  Purulence: None noted  Polyps: None noted  Nasopharynx and eustachian tube: No masses  Other: The middle and superior meatus, the turbinates, and the spheno-ethmoid recess were inspected and seen to be without significant abnormal findings.     Condition: Stable    Complications: Patient tolerated the  procedure well with no immediate complication.    Arcadio Javier MD        Current Outpatient Medications   Medication Sig Dispense Refill    methylPREDNISolone 4 MG Oral Tablet Therapy Pack Take by oral route. 21 tablet 0    ipratropium 0.06 % Nasal Solution 2 sprays by Nasal route 2 (two) times daily. 1 each 5    azelastine 0.1 % Nasal Solution 2 sprays by Nasal route 2 (two) times daily. 30 mL 0    loratadine-pseudoephedrine ER  MG Oral Tablet 24 Hr Take 1 tablet by mouth at bedtime. 30 tablet 1    fluticasone propionate 50 MCG/ACT Nasal Suspension 2 sprays by Nasal route daily.      glycopyrrolate 1 MG Oral Tab Take 1 tablet (1 mg total) by mouth 2 (two) times daily.      ipratropium 0.03 % Nasal Solution 2 sprays 3 (three) times daily as needed for Rhinitis.      montelukast 10 MG Oral Tab Take 1 tablet (10 mg total) by mouth nightly.      levothyroxine 100 MCG Oral Tab Take 1 tablet (100 mcg total) by mouth daily.      methylPREDNISolone 4 MG Oral Tablet Therapy Pack Take by oral route. 21 tablet 0    TRAMADOL 50 MG Oral Tab TAKE 1 TABLET BY MOUTH AT NIGHT AS NEEDED FOR PAIN 30 tablet 0    methylPREDNISolone 4 MG Oral Tablet Therapy Pack Use as directed for 6 days 21 tablet 0    levothyroxine 75 MCG Oral Tab Take 1 tablet (75 mcg total) by mouth daily. 90 tablet 3    atorvastatin 20 MG Oral Tab Take 1 tablet (20 mg total) by mouth daily. 90 tablet 3    fluticasone-salmeterol 250-50 MCG/DOSE Inhalation Aerosol Powder, Breath Activated Inhale 1 puff into the lungs every 12 (twelve) hours. 60 each 2    loratadine 10 MG Oral Tab Take 1 tablet (10 mg total) by mouth daily. 30 tablet 1    ketorolac tromethamine 0.5 % Ophthalmic Solution Apply 1 drop to eye 4 (four) times daily. To the operative eye starting 3 days prior to surgery 5 mL 1    Multiple Vitamins-Minerals (CENTRUM SILVER) Oral Tab Take 1 tablet by mouth daily.      aspirin 81 MG Oral Tab EC Take 1 tablet (81 mg total) by mouth daily.       pseudoephedrine  MG Oral Tablet 12 Hr Take 1 tablet (120 mg total) by mouth Q12H. (Patient not taking: Reported on 2/5/2024) 42 tablet 0      Past Medical History:    Asthma (HCC)    Coronary atherosclerosis    Disorder of thyroid    Hypercholesterolemia    Other and unspecified hyperlipidemia      Social History:  Social History     Socioeconomic History    Marital status:    Tobacco Use    Smoking status: Former    Smokeless tobacco: Never    Tobacco comments:     Quit when she was 36 yo   Substance and Sexual Activity    Alcohol use: Yes     Comment: rarely    Drug use: No     Social Determinants of Health      Received from [employer], [facility]          Arcadio Javier MD  5/14/2024  11:14 AM

## 2024-05-22 ENCOUNTER — TELEPHONE (OUTPATIENT)
Dept: OTOLARYNGOLOGY | Facility: CLINIC | Age: 79
End: 2024-05-22

## 2024-05-22 DIAGNOSIS — H69.93 DYSFUNCTION OF BOTH EUSTACHIAN TUBES: Primary | ICD-10-CM

## 2024-05-22 NOTE — TELEPHONE ENCOUNTER
Per patient wants to have a hearing test and balance test and asking if Dr. Javier can put in referrals so she can schedule. Please advise

## 2024-05-28 ENCOUNTER — OFFICE VISIT (OUTPATIENT)
Dept: OTOLARYNGOLOGY | Facility: CLINIC | Age: 79
End: 2024-05-28

## 2024-05-28 VITALS — HEIGHT: 60 IN | WEIGHT: 105 LBS | BODY MASS INDEX: 20.62 KG/M2

## 2024-05-28 DIAGNOSIS — R09.82 PND (POST-NASAL DRIP): ICD-10-CM

## 2024-05-28 DIAGNOSIS — H69.90 EUSTACHIAN TUBE DISORDER, UNSPECIFIED LATERALITY: Primary | ICD-10-CM

## 2024-05-28 PROCEDURE — 99213 OFFICE O/P EST LOW 20 MIN: CPT | Performed by: STUDENT IN AN ORGANIZED HEALTH CARE EDUCATION/TRAINING PROGRAM

## 2024-05-28 PROCEDURE — G2211 COMPLEX E/M VISIT ADD ON: HCPCS | Performed by: STUDENT IN AN ORGANIZED HEALTH CARE EDUCATION/TRAINING PROGRAM

## 2024-05-28 RX ORDER — GLYCOPYRROLATE 1 MG/1
1 TABLET ORAL 2 TIMES DAILY
Qty: 60 TABLET | Refills: 0 | Status: SHIPPED | OUTPATIENT
Start: 2024-05-28 | End: 2024-06-27

## 2024-05-28 NOTE — PROGRESS NOTES
Monica Avery is a 78 year old female.   Chief Complaint   Patient presents with    Ear Problem     Pt feels an imbalance at times, not dizziness     Sinus Problem     Constant runny nose , per pt nasal spray and oral mediations not helping        ASSESSMENT AND PLAN:   1. Eustachian tube disorder, unspecified laterality  Is a 78-year-old who presents in follow-up regarding her left-sided eustachian tube dysfunction.  She is seen to have a negative pressure of -167 about 2 weeks ago.  She says that this issue has cleared up back to her baseline and she is happy with her hearing now.  She continues to have nasal drip despite Atrovent and Astelin.  She had a fall the other day while going down the stairs    On exam she has not improved appearance of the retraction of her left tympanic membrane.  No fluid or otitis    Appears that her eustachian tube dysfunction has resolved itself on the left side.  She denies any true vertigo or significant dizziness with regards to her fall.  Will try Robinul for her postnasal drip, may be vasomotor rhinitis possibly with age.  Discussed the use and risk of the medication and she will follow-up.      Longitudinal care will be provided for her vasomotor rhinitis I have seen her several times in the past, will continue to follow her with possible treatment alterations    2. PND (post-nasal drip)        The patient indicates understanding of these issues and agrees to the plan.      EXAM:   Ht 5' (1.524 m)   Wt 105 lb (47.6 kg)   BMI 20.51 kg/m²     Pertinent exam findings may also be noted above in assessment and plan     System Details   Skin Inspection - Normal.   Constitutional Overall appearance - Normal.   Head/Face Symmetric, TMJ tenderness not present    Eyes EOMI, PERRL   Right ear:  Canal clear, TM intact, no TAMMI   Left ear:  Canal clear, TM intact, no TAMMI   Nose: Septum midline, inferior turbinates not enlarged, nasal valves without collapse    Oral  cavity/Oropharynx: No lesions or masses on inspection or palpation, tonsils symmetric    Neck: Soft without LAD, thyroid not enlarged  Voice clear/ no stridor   Other:      Scopes and Procedures:             Current Outpatient Medications   Medication Sig Dispense Refill    glycopyrrolate 1 MG Oral Tab Take 1 tablet (1 mg total) by mouth 2 (two) times daily. 60 tablet 0    ipratropium 0.06 % Nasal Solution 2 sprays by Nasal route 2 (two) times daily. 1 each 5    azelastine 0.1 % Nasal Solution 2 sprays by Nasal route 2 (two) times daily. 30 mL 0    loratadine-pseudoephedrine ER  MG Oral Tablet 24 Hr Take 1 tablet by mouth at bedtime. 30 tablet 1    fluticasone propionate 50 MCG/ACT Nasal Suspension 2 sprays by Nasal route daily.      ipratropium 0.03 % Nasal Solution 2 sprays 3 (three) times daily as needed for Rhinitis.      montelukast 10 MG Oral Tab Take 1 tablet (10 mg total) by mouth nightly.      levothyroxine 100 MCG Oral Tab Take 1 tablet (100 mcg total) by mouth daily.      pseudoephedrine  MG Oral Tablet 12 Hr Take 1 tablet (120 mg total) by mouth Q12H. 42 tablet 0    levothyroxine 75 MCG Oral Tab Take 1 tablet (75 mcg total) by mouth daily. 90 tablet 3    atorvastatin 20 MG Oral Tab Take 1 tablet (20 mg total) by mouth daily. 90 tablet 3    fluticasone-salmeterol 250-50 MCG/DOSE Inhalation Aerosol Powder, Breath Activated Inhale 1 puff into the lungs every 12 (twelve) hours. 60 each 2    loratadine 10 MG Oral Tab Take 1 tablet (10 mg total) by mouth daily. 30 tablet 1    ketorolac tromethamine 0.5 % Ophthalmic Solution Apply 1 drop to eye 4 (four) times daily. To the operative eye starting 3 days prior to surgery 5 mL 1    Multiple Vitamins-Minerals (CENTRUM SILVER) Oral Tab Take 1 tablet by mouth daily.      aspirin 81 MG Oral Tab EC Take 1 tablet (81 mg total) by mouth daily.      methylPREDNISolone 4 MG Oral Tablet Therapy Pack Take by oral route. (Patient not taking: Reported on 5/28/2024)  21 tablet 0    methylPREDNISolone 4 MG Oral Tablet Therapy Pack Take by oral route. (Patient not taking: Reported on 5/28/2024) 21 tablet 0    TRAMADOL 50 MG Oral Tab TAKE 1 TABLET BY MOUTH AT NIGHT AS NEEDED FOR PAIN (Patient not taking: Reported on 5/28/2024) 30 tablet 0    methylPREDNISolone 4 MG Oral Tablet Therapy Pack Use as directed for 6 days (Patient not taking: Reported on 5/28/2024) 21 tablet 0      Past Medical History:    Asthma (HCC)    Coronary atherosclerosis    Disorder of thyroid    Hypercholesterolemia    Other and unspecified hyperlipidemia      Social History:  Social History     Socioeconomic History    Marital status:    Tobacco Use    Smoking status: Former    Smokeless tobacco: Never    Tobacco comments:     Quit when she was 36 yo   Substance and Sexual Activity    Alcohol use: Yes     Comment: rarely    Drug use: No     Social Determinants of Health      [facility] [employer]          Arcadio Javier MD  5/28/2024  12:26 PM

## (undated) DEVICE — 3 ML SYRINGE LUER-LOCK TIP: Brand: MONOJECT

## (undated) DEVICE — MEDI-VAC NON-CONDUCTIVE SUCTION TUBING: Brand: CARDINAL HEALTH

## (undated) DEVICE — 6 ML SYRINGE LUER-LOCK TIP: Brand: MONOJECT

## (undated) DEVICE — Device: Brand: DUAL NARE NASAL CANNULAE FEMALE LUER CON 7FT O2 TUBE

## (undated) DEVICE — KIT CLEAN ENDOKIT 1.1OZ GOWNX2

## (undated) DEVICE — CONMED SCOPE SAVER BITE BLOCK, 20X27 MM: Brand: SCOPE SAVER

## (undated) DEVICE — 60 ML SYRINGE REGULAR TIP: Brand: MONOJECT

## (undated) DEVICE — AIRLIFE™ MISTY FAST™ SMALL VOLUME NEBULIZER WITH 7 FOOT (2.1 M) CRUSH RESISTANT OXYGEN TUBING, BAFFLED MOUTHPIECE, AND 6 INCH (15 CM) FLEXTUBE: Brand: AIRLIFE™

## (undated) DEVICE — SINGLE USE BIOPSY VALVE MAJ-210: Brand: SINGLE USE BIOPSY VALVE (STERILE)

## (undated) DEVICE — YANKAUER SUCTION INSTRUMENT NO CONTROL VENT, BULB TIP, CLEAR: Brand: YANKAUER

## (undated) DEVICE — SINGLE USE SUCTION VALVE MAJ-209: Brand: SINGLE USE SUCTION VALVE (STERILE)

## (undated) DEVICE — CONTAINER SPEC CLIKSEAL 4OZ

## (undated) NOTE — LETTER
AUTHORIZATION FOR SURGICAL OPERATION OR OTHER PROCEDURE    1. I hereby authorize   and [facility] staff assigned to my case to perform the following operation and/or procedure at the [facility]:  Left ear myringotomy   _______________________________________________________________________________________________      _______________________________________________________________________________________________    2. My physician has explained the nature and purpose of the operation or other procedure, possible alternative methods of treatment, the risks involved, and the possibility of complication to me. I acknowledge that no guarantee has been made as to the result that may be obtained. 3.  I recognize that, during the course of this operation, or other procedure, unforseen conditions may necessitate additional or different procedure than those listed above. I, therefore, further authorize and request that the above named physician, his/her physician assistants or designees perform such procedures as are, in his/her professional opinion, necessary and desirable. 4.  Any tissue or organs removed in the operation or other procedure may be disposed of by and at the discretion of the [facility] and [facility] AT [facility] [place]. 5.  I understand that in the event of a medical emergency, I will be transported by local paramedics to [facility] or other [facility] emergency department. 6.  I certify that I have read and fully understand the above consent to operation and/or other procedure. 7.  I acknowledge that my physician has explained sedation/analgesia administration to me including the risks and benefits. I consent to the administration of sedation/analgesia as may be necessary or desirable in the judgement of my physician.     Witness signature: ___________________________________________________ Date:  ______/______/_____                    Time:  ________ A.M.  P.M. Patient Name:  ______________________________________________________  (please print)      Patient signature:  ___________________________________________________             Relationship to Patient:           []  Parent    Responsible person                          []  Spouse  In case of minor or                    [] Other  _____________   Incompetent name:  __________________________________________________                               (please print)      _____________      Responsible person  In case of minor or  Incompetent signature:  _______________________________________________    Statement of Physician  My signature below affirms that prior to the time of the procedure, I have explained to the patient and/or his/her guardian, the risks and benefits involved in the proposed treatment and any reasonable alternative to the proposed treatment. I have also explained the risks and benefits involved in the refusal of the proposed treatment and have answered the patient's questions.                         Date:  ______/______/_______  Provider                      Signature:  __________________________________________________________       Time:  ___________ A.M    P.M.